# Patient Record
Sex: FEMALE | Race: WHITE | NOT HISPANIC OR LATINO | Employment: OTHER | URBAN - METROPOLITAN AREA
[De-identification: names, ages, dates, MRNs, and addresses within clinical notes are randomized per-mention and may not be internally consistent; named-entity substitution may affect disease eponyms.]

---

## 2017-01-17 ENCOUNTER — HOSPITAL ENCOUNTER (EMERGENCY)
Facility: HOSPITAL | Age: 67
Discharge: HOME | End: 2017-01-18
Attending: EMERGENCY MEDICINE | Admitting: EMERGENCY MEDICINE

## 2017-01-17 DIAGNOSIS — S05.12XA PERIORBITAL CONTUSION, LEFT, INITIAL ENCOUNTER: ICD-10-CM

## 2017-01-17 DIAGNOSIS — S05.32XA CONJUNCTIVAL LACERATION, LEFT, INITIAL ENCOUNTER: Primary | ICD-10-CM

## 2017-01-17 PROCEDURE — 99283 EMERGENCY DEPT VISIT LOW MDM: CPT

## 2017-01-17 RX ORDER — AZELASTINE 1 MG/ML
2 SPRAY, METERED NASAL 2 TIMES DAILY
COMMUNITY

## 2017-01-17 RX ORDER — FEXOFENADINE HCL 180 MG/1
180 TABLET ORAL DAILY
COMMUNITY

## 2017-01-17 RX ORDER — LEVOTHYROXINE SODIUM 0.05 MG/1
50 TABLET ORAL DAILY
COMMUNITY

## 2017-01-17 RX ORDER — IRBESARTAN 75 MG/1
12.5 TABLET ORAL DAILY
COMMUNITY

## 2017-01-17 RX ORDER — ATORVASTATIN CALCIUM 10 MG/1
10 TABLET, FILM COATED ORAL DAILY
COMMUNITY

## 2017-01-18 ENCOUNTER — APPOINTMENT (OUTPATIENT)
Dept: CT IMAGING | Facility: HOSPITAL | Age: 67
End: 2017-01-18

## 2017-01-18 VITALS
WEIGHT: 160 LBS | HEIGHT: 65 IN | RESPIRATION RATE: 18 BRPM | BODY MASS INDEX: 26.66 KG/M2 | HEART RATE: 80 BPM | SYSTOLIC BLOOD PRESSURE: 158 MMHG | OXYGEN SATURATION: 95 % | TEMPERATURE: 98.5 F | DIASTOLIC BLOOD PRESSURE: 97 MMHG

## 2017-01-18 PROCEDURE — 70486 CT MAXILLOFACIAL W/O DYE: CPT

## 2017-01-18 RX ORDER — AMOXICILLIN AND CLAVULANATE POTASSIUM 875; 125 MG/1; MG/1
1 TABLET, FILM COATED ORAL 2 TIMES DAILY
Qty: 14 TABLET | Refills: 0 | Status: SHIPPED | OUTPATIENT
Start: 2017-01-18 | End: 2017-01-25

## 2017-01-18 RX ORDER — OXYCODONE HYDROCHLORIDE AND ACETAMINOPHEN 5; 325 MG/1; MG/1
1 TABLET ORAL EVERY 4 HOURS PRN
Qty: 30 TABLET | Refills: 0 | Status: SHIPPED | OUTPATIENT
Start: 2017-01-18

## 2017-01-18 NOTE — ED NOTES
PT LEFT EYE REDNESS, AND BLURRY VISION.  LEFT CHEEKBONE PAIN. PT SLIPPED ON A DOG TOY AROUND 2100, AND HIT EYE/CHEEK ON EITHER EYE GLASSES, OR SIDE OF TEA TUMBLER CAUSING INJURY TO LEFT EYE.  PT STATES SHE FEELS LIKE SOMETHING WENT INTO LEFT EYE.  EITHER TEA OR HER GLASSES, BUT HER CHEEK BONE HURTS THE MOST.     Jenny Reed RN  01/17/17 3889

## 2017-01-18 NOTE — ED PROVIDER NOTES
Subjective   HPI Comments: Jessica Grace is a 66 y.o.female who presents to the ED with c/o eye trauma onset just PTA. She states she was walking through her home when she tripped and fell forward. She did not hit her head or experience any LOC with the incident. However, she believes her glasses hit her eye during the fall. Since, she has developed a left periorbital abrasion, left eye pain, and redness. She notes her vision becomes blurred after closing her eyes for a period of time. She denies any other complaints at this time.       Patient is a 66 y.o. female presenting with eye problem.   History provided by:  Patient  Eye Problem   Location:  Left eye  Quality:  Unable to specify  Severity:  Moderate  Onset quality:  Sudden  Timing:  Constant  Progression:  Unchanged  Chronicity:  New  Context: direct trauma    Relieved by:  None tried  Exacerbated by: closing eyes for a period of time.  Ineffective treatments:  None tried  Associated symptoms: blurred vision and redness    Associated symptoms: no double vision, no headaches, no nausea, no numbness, no photophobia and no weakness    Risk factors: no previous injury to eye        Review of Systems   Constitutional: Negative for chills and fever.   HENT: Negative for congestion, rhinorrhea and sore throat.    Eyes: Positive for blurred vision, pain, redness and visual disturbance. Negative for double vision and photophobia.   Respiratory: Negative for cough and shortness of breath.    Gastrointestinal: Negative for diarrhea and nausea.   Skin: Positive for wound (left eye wound ).   Neurological: Negative for dizziness, weakness, light-headedness, numbness and headaches.   All other systems reviewed and are negative.      Past Medical History   Diagnosis Date   • Disease of thyroid gland    • High cholesterol    • Hypertension        No Known Allergies    History reviewed. No pertinent past surgical history.    History reviewed. No pertinent family  history.    Social History     Social History   • Marital status:      Spouse name: N/A   • Number of children: N/A   • Years of education: N/A     Social History Main Topics   • Smoking status: Never Smoker   • Smokeless tobacco: None   • Alcohol use No   • Drug use: No   • Sexual activity: Defer     Other Topics Concern   • None     Social History Narrative   • None         Objective   Physical Exam   Constitutional: She is oriented to person, place, and time. She appears well-developed and well-nourished.  Non-toxic appearance. No distress.   HENT:   Right Ear: External ear normal.   Left Ear: External ear normal.   Nose: Nose normal.   Eyes: EOM and lids are normal. Pupils are equal, round, and reactive to light.   Abrasion at the left inferior eyelid and maxillary region. Scleral laceration at the lateral eye extending superiorly to the iris. Subconjunctival hemorrage at the lateral portion of the left eye. Normal pupil response. Nrmal visual acuity. No signs of entrapment or bony tenderness.    Neck: Normal range of motion. Neck supple. No tracheal deviation present.   Cardiovascular: Normal rate, regular rhythm and normal heart sounds.  Exam reveals no gallop, no friction rub and no decreased pulses.    No murmur heard.  Pulmonary/Chest: Effort normal and breath sounds normal. No respiratory distress. She has no decreased breath sounds. She has no wheezes. She has no rhonchi. She has no rales.   Abdominal: Soft. Normal appearance and bowel sounds are normal. There is no tenderness. There is no rebound and no guarding.   Musculoskeletal: Normal range of motion. She exhibits no deformity.   Lymphadenopathy:     She has no cervical adenopathy.   Neurological: She is alert and oriented to person, place, and time. She has normal strength. No cranial nerve deficit or sensory deficit.   Skin: Skin is warm and dry. No rash noted. She is not diaphoretic.   Psychiatric: She has a normal mood and affect. Her  speech is normal and behavior is normal. Judgment and thought content normal. Cognition and memory are normal.   Nursing note and vitals reviewed.      Procedures         ED Course  ED Course                     MDM  Number of Diagnoses or Management Options  Conjunctival laceration, left, initial encounter: new and requires workup  Periorbital contusion, left, initial encounter: new and requires workup  Diagnosis management comments: CT reported irrgularities to superior orbit of left eyes, considered acute versus chronic injury, but patient did not suffer any trauma to right eye, and has no bony tenderness around either eye, not clinically consistent with fracture.     Small amount of air superior to left eye.     Discussed patient with on call ophthamologist, Dr. Arnett of , who suggests followup in  ophthamology clinic at 8 am on 1-, patient informed of findings and agrees to followup.     DC with percocet for pain.        Amount and/or Complexity of Data Reviewed  Tests in the radiology section of CPT®: ordered and reviewed  Review and summarize past medical records: yes  Discuss the patient with other providers: yes  Independent visualization of images, tracings, or specimens: yes    Patient Progress  Patient progress: stable      Final diagnoses:   None       Documentation assistance provided by pari Dallas.  Information recorded by the scribe was done at my direction and has been verified and validated by me.     Jaimee Dallas  01/17/17 1112       Litzy Rayo MD  01/18/17 9110

## 2021-03-01 ENCOUNTER — IMMUNIZATION (OUTPATIENT)
Dept: VACCINE CLINIC | Facility: HOSPITAL | Age: 71
End: 2021-03-01

## 2021-03-01 PROCEDURE — 91300 HC SARSCOV02 VAC 30MCG/0.3ML IM: CPT | Performed by: INTERNAL MEDICINE

## 2021-03-01 PROCEDURE — 0001A: CPT | Performed by: INTERNAL MEDICINE

## 2021-03-22 ENCOUNTER — IMMUNIZATION (OUTPATIENT)
Dept: VACCINE CLINIC | Facility: HOSPITAL | Age: 71
End: 2021-03-22

## 2021-03-22 PROCEDURE — 91300 HC SARSCOV02 VAC 30MCG/0.3ML IM: CPT | Performed by: INTERNAL MEDICINE

## 2021-03-22 PROCEDURE — 0002A: CPT | Performed by: INTERNAL MEDICINE
